# Patient Record
Sex: MALE | Race: WHITE | NOT HISPANIC OR LATINO | URBAN - METROPOLITAN AREA
[De-identification: names, ages, dates, MRNs, and addresses within clinical notes are randomized per-mention and may not be internally consistent; named-entity substitution may affect disease eponyms.]

---

## 2023-12-19 PROCEDURE — 99284 EMERGENCY DEPT VISIT MOD MDM: CPT | Performed by: STUDENT IN AN ORGANIZED HEALTH CARE EDUCATION/TRAINING PROGRAM

## 2023-12-19 PROCEDURE — 99285 EMERGENCY DEPT VISIT HI MDM: CPT | Performed by: STUDENT IN AN ORGANIZED HEALTH CARE EDUCATION/TRAINING PROGRAM

## 2023-12-20 ENCOUNTER — APPOINTMENT (OUTPATIENT)
Dept: RADIOLOGY | Facility: HOSPITAL | Age: 28
End: 2023-12-20

## 2023-12-20 ENCOUNTER — HOSPITAL ENCOUNTER (EMERGENCY)
Facility: HOSPITAL | Age: 28
Discharge: HOME | End: 2023-12-20
Attending: STUDENT IN AN ORGANIZED HEALTH CARE EDUCATION/TRAINING PROGRAM

## 2023-12-20 VITALS
HEART RATE: 78 BPM | WEIGHT: 220 LBS | DIASTOLIC BLOOD PRESSURE: 70 MMHG | RESPIRATION RATE: 16 BRPM | BODY MASS INDEX: 32.58 KG/M2 | SYSTOLIC BLOOD PRESSURE: 131 MMHG | OXYGEN SATURATION: 98 % | HEIGHT: 69 IN

## 2023-12-20 DIAGNOSIS — N20.0 KIDNEY STONE: Primary | ICD-10-CM

## 2023-12-20 LAB
ALBUMIN SERPL BCP-MCNC: 4.7 G/DL (ref 3.4–5)
ALP SERPL-CCNC: 30 U/L (ref 33–120)
ALT SERPL W P-5'-P-CCNC: 22 U/L (ref 10–52)
ANION GAP SERPL CALC-SCNC: 11 MMOL/L (ref 10–20)
APPEARANCE UR: ABNORMAL
AST SERPL W P-5'-P-CCNC: 17 U/L (ref 9–39)
BASOPHILS # BLD AUTO: 0.02 X10*3/UL (ref 0–0.1)
BASOPHILS NFR BLD AUTO: 0.2 %
BILIRUB SERPL-MCNC: 1.1 MG/DL (ref 0–1.2)
BILIRUB UR STRIP.AUTO-MCNC: NEGATIVE MG/DL
BUN SERPL-MCNC: 18 MG/DL (ref 6–23)
CALCIUM SERPL-MCNC: 9.2 MG/DL (ref 8.6–10.3)
CAOX CRY #/AREA UR COMP ASSIST: ABNORMAL /HPF
CHLORIDE SERPL-SCNC: 103 MMOL/L (ref 98–107)
CO2 SERPL-SCNC: 26 MMOL/L (ref 21–32)
COLOR UR: YELLOW
CREAT SERPL-MCNC: 1.25 MG/DL (ref 0.5–1.3)
EOSINOPHIL # BLD AUTO: 0.02 X10*3/UL (ref 0–0.7)
EOSINOPHIL NFR BLD AUTO: 0.2 %
ERYTHROCYTE [DISTWIDTH] IN BLOOD BY AUTOMATED COUNT: 11.9 % (ref 11.5–14.5)
GFR SERPL CREATININE-BSD FRML MDRD: 81 ML/MIN/1.73M*2
GLUCOSE SERPL-MCNC: 127 MG/DL (ref 74–99)
GLUCOSE UR STRIP.AUTO-MCNC: NEGATIVE MG/DL
HCT VFR BLD AUTO: 43.4 % (ref 41–52)
HGB BLD-MCNC: 15.1 G/DL (ref 13.5–17.5)
HOLD SPECIMEN: NORMAL
IMM GRANULOCYTES # BLD AUTO: 0.06 X10*3/UL (ref 0–0.7)
IMM GRANULOCYTES NFR BLD AUTO: 0.5 % (ref 0–0.9)
KETONES UR STRIP.AUTO-MCNC: ABNORMAL MG/DL
LEUKOCYTE ESTERASE UR QL STRIP.AUTO: NEGATIVE
LIPASE SERPL-CCNC: 15 U/L (ref 9–82)
LYMPHOCYTES # BLD AUTO: 1.43 X10*3/UL (ref 1.2–4.8)
LYMPHOCYTES NFR BLD AUTO: 11.3 %
MCH RBC QN AUTO: 28.7 PG (ref 26–34)
MCHC RBC AUTO-ENTMCNC: 34.8 G/DL (ref 32–36)
MCV RBC AUTO: 83 FL (ref 80–100)
MONOCYTES # BLD AUTO: 0.65 X10*3/UL (ref 0.1–1)
MONOCYTES NFR BLD AUTO: 5.1 %
NEUTROPHILS # BLD AUTO: 10.48 X10*3/UL (ref 1.2–7.7)
NEUTROPHILS NFR BLD AUTO: 82.7 %
NITRITE UR QL STRIP.AUTO: NEGATIVE
NRBC BLD-RTO: 0 /100 WBCS (ref 0–0)
PH UR STRIP.AUTO: 5 [PH]
PLATELET # BLD AUTO: 284 X10*3/UL (ref 150–450)
POTASSIUM SERPL-SCNC: 3.7 MMOL/L (ref 3.5–5.3)
PROT SERPL-MCNC: 7.2 G/DL (ref 6.4–8.2)
PROT UR STRIP.AUTO-MCNC: ABNORMAL MG/DL
RBC # BLD AUTO: 5.26 X10*6/UL (ref 4.5–5.9)
RBC # UR STRIP.AUTO: ABNORMAL /UL
RBC #/AREA URNS AUTO: >20 /HPF
SODIUM SERPL-SCNC: 136 MMOL/L (ref 136–145)
SP GR UR STRIP.AUTO: 1.04
UROBILINOGEN UR STRIP.AUTO-MCNC: <2 MG/DL
WBC # BLD AUTO: 12.7 X10*3/UL (ref 4.4–11.3)
WBC #/AREA URNS AUTO: ABNORMAL /HPF

## 2023-12-20 PROCEDURE — 96361 HYDRATE IV INFUSION ADD-ON: CPT

## 2023-12-20 PROCEDURE — 85025 COMPLETE CBC W/AUTO DIFF WBC: CPT | Performed by: STUDENT IN AN ORGANIZED HEALTH CARE EDUCATION/TRAINING PROGRAM

## 2023-12-20 PROCEDURE — 36415 COLL VENOUS BLD VENIPUNCTURE: CPT | Performed by: STUDENT IN AN ORGANIZED HEALTH CARE EDUCATION/TRAINING PROGRAM

## 2023-12-20 PROCEDURE — 96374 THER/PROPH/DIAG INJ IV PUSH: CPT

## 2023-12-20 PROCEDURE — 81001 URINALYSIS AUTO W/SCOPE: CPT | Performed by: STUDENT IN AN ORGANIZED HEALTH CARE EDUCATION/TRAINING PROGRAM

## 2023-12-20 PROCEDURE — 2500000004 HC RX 250 GENERAL PHARMACY W/ HCPCS (ALT 636 FOR OP/ED)

## 2023-12-20 PROCEDURE — 83690 ASSAY OF LIPASE: CPT | Performed by: STUDENT IN AN ORGANIZED HEALTH CARE EDUCATION/TRAINING PROGRAM

## 2023-12-20 PROCEDURE — 2550000001 HC RX 255 CONTRASTS: Performed by: STUDENT IN AN ORGANIZED HEALTH CARE EDUCATION/TRAINING PROGRAM

## 2023-12-20 PROCEDURE — 80053 COMPREHEN METABOLIC PANEL: CPT | Performed by: STUDENT IN AN ORGANIZED HEALTH CARE EDUCATION/TRAINING PROGRAM

## 2023-12-20 PROCEDURE — 74177 CT ABD & PELVIS W/CONTRAST: CPT

## 2023-12-20 PROCEDURE — 74177 CT ABD & PELVIS W/CONTRAST: CPT | Performed by: RADIOLOGY

## 2023-12-20 RX ORDER — KETOROLAC TROMETHAMINE 30 MG/ML
30 INJECTION, SOLUTION INTRAMUSCULAR; INTRAVENOUS ONCE
Status: DISCONTINUED | OUTPATIENT
Start: 2023-12-20 | End: 2023-12-20 | Stop reason: HOSPADM

## 2023-12-20 RX ORDER — OXYCODONE HYDROCHLORIDE 5 MG/1
5 TABLET ORAL EVERY 6 HOURS PRN
Qty: 6 TABLET | Refills: 0 | Status: SHIPPED | OUTPATIENT
Start: 2023-12-20

## 2023-12-20 RX ORDER — NAPROXEN 500 MG/1
500 TABLET ORAL
Qty: 30 TABLET | Refills: 0 | Status: SHIPPED | OUTPATIENT
Start: 2023-12-20 | End: 2024-01-04

## 2023-12-20 RX ORDER — ONDANSETRON HYDROCHLORIDE 2 MG/ML
4 INJECTION, SOLUTION INTRAVENOUS ONCE
Status: COMPLETED | OUTPATIENT
Start: 2023-12-20 | End: 2023-12-20

## 2023-12-20 RX ORDER — KETOROLAC TROMETHAMINE 30 MG/ML
INJECTION, SOLUTION INTRAMUSCULAR; INTRAVENOUS
Status: COMPLETED
Start: 2023-12-20 | End: 2023-12-20

## 2023-12-20 RX ORDER — ONDANSETRON HYDROCHLORIDE 2 MG/ML
INJECTION, SOLUTION INTRAVENOUS
Status: COMPLETED
Start: 2023-12-20 | End: 2023-12-20

## 2023-12-20 RX ADMIN — ONDANSETRON HYDROCHLORIDE 4 MG: 2 INJECTION, SOLUTION INTRAVENOUS at 01:45

## 2023-12-20 RX ADMIN — IOHEXOL 75 ML: 350 INJECTION, SOLUTION INTRAVENOUS at 02:56

## 2023-12-20 RX ADMIN — KETOROLAC TROMETHAMINE 30 MG: 30 INJECTION, SOLUTION INTRAMUSCULAR at 01:46

## 2023-12-20 RX ADMIN — SODIUM CHLORIDE, POTASSIUM CHLORIDE, SODIUM LACTATE AND CALCIUM CHLORIDE 1000 ML: 600; 310; 30; 20 INJECTION, SOLUTION INTRAVENOUS at 02:13

## 2023-12-20 RX ADMIN — ONDANSETRON 4 MG: 2 INJECTION INTRAMUSCULAR; INTRAVENOUS at 01:45

## 2023-12-20 ASSESSMENT — LIFESTYLE VARIABLES
HAVE YOU EVER FELT YOU SHOULD CUT DOWN ON YOUR DRINKING: NO
EVER FELT BAD OR GUILTY ABOUT YOUR DRINKING: NO
REASON UNABLE TO ASSESS: NO
HAVE PEOPLE ANNOYED YOU BY CRITICIZING YOUR DRINKING: NO
EVER HAD A DRINK FIRST THING IN THE MORNING TO STEADY YOUR NERVES TO GET RID OF A HANGOVER: NO

## 2023-12-20 ASSESSMENT — PAIN - FUNCTIONAL ASSESSMENT
PAIN_FUNCTIONAL_ASSESSMENT: 0-10
PAIN_FUNCTIONAL_ASSESSMENT: 0-10

## 2023-12-20 ASSESSMENT — COLUMBIA-SUICIDE SEVERITY RATING SCALE - C-SSRS
2. HAVE YOU ACTUALLY HAD ANY THOUGHTS OF KILLING YOURSELF?: NO
6. HAVE YOU EVER DONE ANYTHING, STARTED TO DO ANYTHING, OR PREPARED TO DO ANYTHING TO END YOUR LIFE?: NO
1. IN THE PAST MONTH, HAVE YOU WISHED YOU WERE DEAD OR WISHED YOU COULD GO TO SLEEP AND NOT WAKE UP?: NO

## 2023-12-20 ASSESSMENT — PAIN SCALES - GENERAL
PAINLEVEL_OUTOF10: 10 - WORST POSSIBLE PAIN
PAINLEVEL_OUTOF10: 0 - NO PAIN
PAINLEVEL_OUTOF10: 8

## 2023-12-20 ASSESSMENT — PAIN DESCRIPTION - PROGRESSION: CLINICAL_PROGRESSION: NOT CHANGED

## 2023-12-20 ASSESSMENT — PAIN DESCRIPTION - PAIN TYPE: TYPE: ACUTE PAIN

## 2023-12-20 ASSESSMENT — PAIN DESCRIPTION - ORIENTATION: ORIENTATION: RIGHT;LOWER

## 2023-12-20 ASSESSMENT — PAIN DESCRIPTION - LOCATION: LOCATION: ABDOMEN

## 2023-12-20 NOTE — ED PROVIDER NOTES
HPI   Chief Complaint   Patient presents with    Flank Pain     Right flank pain starting 12/17       Patient is an otherwise healthy 28-year-old male who presents emerged part for right flank pain.  This pain began 3 days ago and his right flank and has begun to radiate to his right side of his abdomen.  He reports nausea or vomiting as well as dysuria.  Denies any gross hematuria.  He does not have any history of kidney stones, but there is a strong family history of kidney stones.  He has not had any fevers, chest pain, shortness of breath, diarrhea, black or bloody stools.  Denies any testicular pain.      History provided by:  Patient and parent                      No data recorded                Patient History   History reviewed. No pertinent past medical history.  History reviewed. No pertinent surgical history.  No family history on file.  Social History     Tobacco Use    Smoking status: Never    Smokeless tobacco: Never   Vaping Use    Vaping Use: Every day   Substance Use Topics    Alcohol use: Not on file    Drug use: Yes     Types: Marijuana       Physical Exam   ED Triage Vitals [12/20/23 0324]   Temp Heart Rate Resp BP   -- 78 16 131/70      SpO2 Temp src Heart Rate Source Patient Position   98 % -- Monitor Sitting      BP Location FiO2 (%)     Right arm --       Physical Exam  Vitals and nursing note reviewed.   Constitutional:       General: He is not in acute distress.     Appearance: He is well-developed. He is not ill-appearing or toxic-appearing.   HENT:      Head: Normocephalic and atraumatic.      Right Ear: External ear normal.      Left Ear: External ear normal.      Nose: Nose normal.      Mouth/Throat:      Mouth: Mucous membranes are moist.   Eyes:      General: No scleral icterus.     Conjunctiva/sclera: Conjunctivae normal.      Pupils: Pupils are equal, round, and reactive to light.   Cardiovascular:      Rate and Rhythm: Normal rate and regular rhythm.      Heart sounds: No murmur  heard.  Pulmonary:      Effort: Pulmonary effort is normal. No respiratory distress.      Breath sounds: Normal breath sounds.   Abdominal:      Palpations: Abdomen is soft. There is no mass.      Tenderness: There is abdominal tenderness. There is right CVA tenderness. There is no guarding or rebound.   Musculoskeletal:         General: No swelling.      Cervical back: Neck supple. No rigidity.   Skin:     General: Skin is warm and dry.   Neurological:      General: No focal deficit present.      Mental Status: He is alert.   Psychiatric:         Mood and Affect: Mood normal.         ED Course & MDM   Diagnoses as of 12/20/23 0733   Kidney stone       Medical Decision Making  Patient is a 28-year-old male who presents emerged part for right flank and right abdominal pain.  He is afebrile he medically stable on presentation.  He is awake and alert, no acute distress, nontoxic in appearance.  There is right CVA tenderness on examination with reported radiation to his right side of his abdomen.  There is strong family history kidney stones.  We will obtain lab work, urinalysis, and CT imaging to evaluate for kidney stone, pyelonephritis, renal infarct, colitis, appendicitis, aortic pathology, cholecystitis, or other pathology.  Patient is given IV fluids, Zofran, Toradol.    CT abdomen pelvis w IV contrast   Final Result    1. Mild right hydroureteronephrosis secondary to obstructing 0.4 cm    calculus at the right vesicoureteral junction.    2. Mild hepatic steatosis.    3. Borderline splenomegaly.    4. Indeterminate 0.7 cm focal hypodensity within the anterior hepatic    dome, statistically likely to reflect a benign entity such as a cyst    or hemangioma.          Signed by: Kana Blackmon 12/20/2023 3:17 AM    Dictation workstation:   [id number]      Results for orders placed or performed during the hospital encounter of 12/20/23  -CBC with Differential:        Result                      Value              Ref Range           WBC                         12.7 (H)          4.4 - 11.3 x*       nRBC                        0.0               0.0 - 0.0 /1*       RBC                         5.26              4.50 - 5.90 *       Hemoglobin                  15.1              13.5 - 17.5 *       Hematocrit                  43.4              41.0 - 52.0 %       MCV                         83                80 - 100 fL         MCH                         28.7              26.0 - 34.0 *       MCHC                        34.8              32.0 - 36.0 *       RDW                         11.9              11.5 - 14.5 %       Platelets                   284               150 - 450 x1*       Neutrophils %               82.7              40.0 - 80.0 %       Immature Granulocytes *     0.5               0.0 - 0.9 %         Lymphocytes %               11.3              13.0 - 44.0 %       Monocytes %                 5.1               2.0 - 10.0 %        Eosinophils %               0.2               0.0 - 6.0 %         Basophils %                 0.2               0.0 - 2.0 %         Neutrophils Absolute        10.48 (H)         1.20 - 7.70 *       Immature Granulocytes *     0.06              0.00 - 0.70 *       Lymphocytes Absolute        1.43              1.20 - 4.80 *       Monocytes Absolute          0.65              0.10 - 1.00 *       Eosinophils Absolute        0.02              0.00 - 0.70 *       Basophils Absolute          0.02              0.00 - 0.10 *  -Comprehensive Metabolic Panel:        Result                      Value             Ref Range           Glucose                     127 (H)           74 - 99 mg/dL       Sodium                      136               136 - 145 mm*       Potassium                   3.7               3.5 - 5.3 mm*       Chloride                    103               98 - 107 mmo*       Bicarbonate                 26                21 - 32 mmol*       Anion Gap                   11                 10 - 20 mmol*       Urea Nitrogen               18                6 - 23 mg/dL        Creatinine                  1.25              0.50 - 1.30 *       eGFR                        81                >60 mL/min/1*       Calcium                     9.2               8.6 - 10.3 m*       Albumin                     4.7               3.4 - 5.0 g/*       Alkaline Phosphatase        30 (L)            33 - 120 U/L        Total Protein               7.2               6.4 - 8.2 g/*       AST                         17                9 - 39 U/L          Bilirubin, Total            1.1               0.0 - 1.2 mg*       ALT                         22                10 - 52 U/L    -Lipase:        Result                      Value             Ref Range           Lipase                      15                9 - 82 U/L     -Urinalysis with Reflex Culture and Microscopic:        Result                      Value             Ref Range           Color, Urine                Yellow            Straw, Yellow       Appearance, Urine           Hazy (N)          Clear               Specific Gravity, Urine     1.042 (N)         1.005 - 1.035       pH, Urine                   5.0               5.0, 5.5, 6.*       Protein, Urine              100 (2+) (N)      NEGATIVE mg/*       Glucose, Urine              NEGATIVE          NEGATIVE mg/*       Blood, Urine                                  NEGATIVE        MODERATE (2+) (A)       Ketones, Urine              80 (2+) (A)       NEGATIVE mg/*       Bilirubin, Urine            NEGATIVE          NEGATIVE            Urobilinogen, Urine         <2.0              <2.0 mg/dL          Nitrite, Urine              NEGATIVE          NEGATIVE            Leukocyte Esterase, Ur*     NEGATIVE          NEGATIVE       -Extra Urine Gray Tube:        Result                      Value             Ref Range           Extra Tube                                                    Hold for add-ons.  -Urinalysis Microscopic:         Result                      Value             Ref Range           WBC, Urine                  1-5               1-5, NONE /H*       RBC, Urine                  >20 (A)           NONE, 1-2, 3*       Calcium Oxalate Zarina*     3+ (A)            NONE, 1+ /HPF     Findings are consistent with a 4 mm kidney stone, which would typically be expected to pass.  He does not have a urinary tract infection.  His pain and nausea are under control with the medications.  He has mild leukocytosis at 12.7, likely reactive to his kidney stone.  Renal function and electrolytes are normal.  Patient is overall well-appearing, with symptoms under control, and reassuring lab work.  He feels comfortable going home and following with urology outpatient.  Patient is given medications and instructed to take them as prescribed.  Home care and return instructions discussed. Patient expressed understanding and agreement. Patient discharged in stable condition.    Primo Marley DO, PGY-3  Emergency Medicine Resident    Amount and/or Complexity of Data Reviewed  Labs: ordered.  Radiology: ordered and independent interpretation performed.  ECG/medicine tests: ordered and independent interpretation performed.        Procedure  Procedures     Primo Marley DO  Resident  12/21/23 0856

## 2023-12-20 NOTE — DISCHARGE INSTRUCTIONS
Return to the emergency department for any new or worsening symptoms such as severe uncontrollable pain, persistent nausea and vomiting, fevers, confusion, chest pain or difficulty breathing.  Use prescribed medications as they are written.  Use caution with the prescribed pain medication as it is sedating, addicting, constipating.  Use as a last resort.  Please follow-up with urologist at your earliest convenience.